# Patient Record
Sex: MALE | Race: OTHER
[De-identification: names, ages, dates, MRNs, and addresses within clinical notes are randomized per-mention and may not be internally consistent; named-entity substitution may affect disease eponyms.]

---

## 2019-12-21 ENCOUNTER — HOSPITAL ENCOUNTER (EMERGENCY)
Dept: HOSPITAL 15 - ER | Age: 39
Discharge: HOME | End: 2019-12-21
Payer: COMMERCIAL

## 2019-12-21 VITALS — BODY MASS INDEX: 29.35 KG/M2 | WEIGHT: 205 LBS | HEIGHT: 70 IN

## 2019-12-21 VITALS — SYSTOLIC BLOOD PRESSURE: 149 MMHG | DIASTOLIC BLOOD PRESSURE: 100 MMHG
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
## 2019-12-21 DIAGNOSIS — S63.641A: Primary | ICD-10-CM
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
## 2019-12-21 PROCEDURE — 73140 X-RAY EXAM OF FINGER(S): CPT

## 2021-04-22 ENCOUNTER — HOSPITAL ENCOUNTER (EMERGENCY)
Dept: HOSPITAL 15 - ER | Age: 41
Discharge: HOME | End: 2021-04-22
Payer: COMMERCIAL

## 2021-04-22 VITALS — DIASTOLIC BLOOD PRESSURE: 107 MMHG | SYSTOLIC BLOOD PRESSURE: 161 MMHG

## 2021-04-22 VITALS — HEIGHT: 70 IN | BODY MASS INDEX: 27.92 KG/M2 | WEIGHT: 195 LBS

## 2021-04-22 DIAGNOSIS — R42: Primary | ICD-10-CM

## 2021-04-22 DIAGNOSIS — F41.9: ICD-10-CM

## 2021-04-22 LAB
ALBUMIN SERPL-MCNC: 4.5 G/DL (ref 3.4–5)
ALCOHOL, URINE: < 3 MG/DL (ref 0–10)
ALP SERPL-CCNC: 73 U/L (ref 45–117)
ALT SERPL-CCNC: 24 U/L (ref 16–61)
AMPHETAMINES UR QL SCN: NEGATIVE
ANION GAP SERPL CALCULATED.3IONS-SCNC: 8 MMOL/L (ref 5–15)
BARBITURATES UR QL SCN: NEGATIVE
BENZODIAZ UR QL SCN: NEGATIVE
BILIRUB SERPL-MCNC: 1.4 MG/DL (ref 0.2–1)
BUN SERPL-MCNC: 19 MG/DL (ref 7–18)
BUN/CREAT SERPL: 18.8
BZE UR QL SCN: NEGATIVE
CALCIUM SERPL-MCNC: 9.3 MG/DL (ref 8.5–10.1)
CANNABINOIDS UR QL SCN: POSITIVE
CHLORIDE SERPL-SCNC: 104 MMOL/L (ref 98–107)
CO2 SERPL-SCNC: 26 MMOL/L (ref 21–32)
GLUCOSE SERPL-MCNC: 90 MG/DL (ref 74–106)
HCT VFR BLD AUTO: 45.1 % (ref 41–53)
HGB BLD-MCNC: 15.3 G/DL (ref 13.5–17.5)
MAGNESIUM SERPL-MCNC: 2.2 MG/DL (ref 1.6–2.6)
MCH RBC QN AUTO: 29.6 PG (ref 28–32)
MCV RBC AUTO: 87.4 FL (ref 80–100)
NRBC BLD QL AUTO: 0.8 %
OPIATES UR QL SCN: NEGATIVE
PCP UR QL SCN: NEGATIVE
POTASSIUM SERPL-SCNC: 4 MMOL/L (ref 3.5–5.1)
PROT SERPL-MCNC: 8.2 G/DL (ref 6.4–8.2)
SODIUM SERPL-SCNC: 138 MMOL/L (ref 136–145)

## 2021-04-22 PROCEDURE — 83735 ASSAY OF MAGNESIUM: CPT

## 2021-04-22 PROCEDURE — 80307 DRUG TEST PRSMV CHEM ANLYZR: CPT

## 2021-04-22 PROCEDURE — 70450 CT HEAD/BRAIN W/O DYE: CPT

## 2021-04-22 PROCEDURE — 93005 ELECTROCARDIOGRAM TRACING: CPT

## 2021-04-22 PROCEDURE — 84484 ASSAY OF TROPONIN QUANT: CPT

## 2021-04-22 PROCEDURE — 80320 DRUG SCREEN QUANTALCOHOLS: CPT

## 2021-04-22 PROCEDURE — 36415 COLL VENOUS BLD VENIPUNCTURE: CPT

## 2021-04-22 PROCEDURE — 85025 COMPLETE CBC W/AUTO DIFF WBC: CPT

## 2021-04-22 PROCEDURE — 80053 COMPREHEN METABOLIC PANEL: CPT

## 2021-04-22 PROCEDURE — 81001 URINALYSIS AUTO W/SCOPE: CPT

## 2025-01-03 ENCOUNTER — HOSPITAL ENCOUNTER (EMERGENCY)
Dept: HOSPITAL 15 - ER | Age: 45
Discharge: HOME | End: 2025-01-03
Payer: COMMERCIAL

## 2025-01-03 VITALS — WEIGHT: 200.4 LBS | BODY MASS INDEX: 28.69 KG/M2 | HEIGHT: 70 IN

## 2025-01-03 VITALS
DIASTOLIC BLOOD PRESSURE: 95 MMHG | HEART RATE: 68 BPM | OXYGEN SATURATION: 96 % | RESPIRATION RATE: 16 BRPM | SYSTOLIC BLOOD PRESSURE: 153 MMHG

## 2025-01-03 DIAGNOSIS — K57.30: Primary | ICD-10-CM
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
## 2025-01-03 DIAGNOSIS — F41.9: ICD-10-CM
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
## 2025-01-03 DIAGNOSIS — Z79.899: ICD-10-CM
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
## 2025-01-03 DIAGNOSIS — S76.012A: ICD-10-CM
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
## 2025-01-03 PROCEDURE — 74176 CT ABD & PELVIS W/O CONTRAST: CPT

## 2025-01-03 NOTE — [facility]
Exam: CT CT AB PEL WO CON-NO ORAL OR IV



History: LEFT GROIN PAIN



Comparison Study:  None



Technique: Multidetector spiral CT of the abdomen and  pelvis  was performed from lung bases to pubic
 symphysis. Imaging was performed without IV contrast. Axial, coronal and sagittal multiplanar reform
ats were obtained from the axial data set by the technologist.



Radiation dose : 



Abdomen/Pelvis: CTDIvol  21 mGy, DLP  913 mGy*cm.



Findings: 



Evaluation of solid organs is limited due to lack of intravenous contrast use.



Lung Bases: No acute or significant lung base finding.  Normal heart size. No pleural or pericardial 
effusion.  



Liver: The liver is normal in size.  No focal lesions. 



Gallbladder and biliary Tree: Unremarkable



Spleen: Unremarkable



Pancreas: The pancreas is grossly normal in appearance. 



Adrenal Glands: Unremarkable 



Kidneys: Kidneys are grossly normal without calculi or hydronephrosis. 



Bladder: Grossly unremarkable for degree of distention.



Bowel: The stomach is grossly normal in appearance. Small bowel and colon are normal in caliber and d
istribution.  Normal appendix is visualized in the right lower quadrant without findings of appendici
tis. Sigmoid diverticulosis.   



Ascites: Absent



Lymphadenopathy: No mesenteric, retroperitoneal or periportal lymphadenopathy. 



Abdominal wall and Mesentery: Unremarkable.



Vasculature: The visualized abdominal aorta is normal in size and caliber.  Evaluation of abdominal a
nd pelvic vessels is limited due to lack of intravenous contrast.



Pelvic Organs: Unremarkable



Musculoskeletal: No aggressive focal bony lesions, acute fractures or dislocation.   



IMPRESSION: 



1. No acute abdominal or pelvic findings. Sigmoid diverticulosis. 



Radiation optimization: All CT scans at this facility use at least one of these dose optimization david
hniques: Automated exposure control mA and/or kV adjustment per patient size (includes targeted exams
 where dose is matched to clinical indication) or iterative reconstruction.



HS:Y



Electronically Signed by: Vishal Garcia at 01/03/2025 14:32:13 PM

## 2025-01-03 NOTE — ED.PDOC
GI ASSESSMENT


HPI Comments


A 44 YEAR OLD MALE PRESENTS TO THE ED WITH CHIEF COMPLAINT OF PELVIC PAIN. 

PATIENT RELAYS THAT HE HAS BEEN EXPERIENCING LEFT GROIN/PELVIC PAIN SINCE 

YESTERDAY. PATIENT DENIES ANY DYSURIA, HEMATURIA, FEVER, CHILLS, ABDOMINAL PAIN,

OR DIZZINESS.


Chief Complaint:  Pelvic Pain


Time Seen by MD:  13:51


Primary Care Provider:  [email or web address]


Reviewed Notes:  Nurses Notes, Medications, Allergies


Allergies:  


Coded Allergies:  


     NO KNOWN ALLERGIES (Unverified , 19)


Home Meds


Active Scripts


Baclofen (Baclofen) 10 Mg Tab, 10 MG PO BID, #20 TAB


   Prov:SHANNON LITTLE         1/3/25


Ibuprofen (Ibuprofen) 800 Mg Tab, 1 TAB PO TID, #30 TAB


   Prov:SHANNON LITTLE         1/3/25


Information Source:  Patient


Mode of Arrival:  Ambulatory


Timing:  Days


Duration:  Since onset


Prehospital treatment:  None


Quality:  Sharp


Vomitus:  None


Stool:  Normal


Severity:  Moderate


Recent:  None


Recent Hx of:  None


Pain Location:  Other (LEFT PELVIC)


Modifying Factors:  Movement, Nothing


Associated sign and symptoms:  Other (LEFT PELVIC PAIN)





Past Medical History


PAST MEDICAL HISTORY:  Anxiety


Surgical History:  Denies all surgeries





Family History


Family History:  Family hx of HTN





Social History


Smoker:  Non-Smoker


Alcohol:  Rarely


Drugs:  Denies Drug Use


Lives In:  Home





Constitutional:  denies: chills, diaphoresis, fatigue, fever, malaise, sweats, 

weakness, others


EENTM:  denies: blurred vision, double vision, ear bleeding, ear discharge, ear 

drainage, ear pain, ear ringing, eye pain, eye redness, hearing loss, mouth 

pain, mouth swelling, nasal discharge, nose bleeding, nose congestion, nose pain

, photophobia, tearing, throat pain, throat swelling, voice changes, others


Respiratory:  denies: cough, hemoptysis, orthopnea, SOB at rest, shortness of 

breath, SOB with excertion, stridor, wheezing, others


Cardiovascular:  denies: chest pain, dizzy spells, diaphoresis, Dyspnea on 

exertion, edema, irregular heart beat, left arm pain, lightheadedness, 

palpitations, PND, syncope, others


Gastrointestinal:  reports: others (LEFT PELVIC PAIN); denies: abdomen 

distended, abdominal pain, blood streaked bowels, constipated, diarrhea, 

dysphagia, difficulty swallowing, hematemesis, melena, nausea, poor appetite, 

poor fluid intake, rectal bleeding, rectal pain, vomiting


Genitourinary:  denies: burning, dysuria, flank pain, frequency, hematuria, 

incontinence, penile discharge, penile sore, pain, testicle pain, testicle 

swelling, urgency, others


Neurological:  denies: dizziness, fainting, headache, left sided numbness, left 

sided weakness, numbness, paresthesia, pre-existing deficit, right sided 

numbness, right sided weakness, seizure, speech problems, tingling, tremors, 

weakness, others


Musculoskeletal:  reports: muscle pain; denies: back pain, gout, joint pain, 

joint swelling, muscle stiffness, neck pain, others


Integumetry:  denies: bruises, change in color, change in hair/nails, dryness, 

laceration, lesions, lumps, rash, wounds, others


Allergic/Immunocompromised:  denies: Difficulty Healing, Frequent Infections, 

Hives, Itching, others


Hematologic/Lymphatic:  denies: anemia, blood clots, easy bleeding, easy 

bruising, swollen glands, others


Endocrine:  denies: excessive hunger, excessive sweating, excessive thirst, 

excessive urination, flushing, intolerance to cold, intolerance to heat, 

unexplained weight gain, unexplained weight loss, others


Psychiatric:  denies: anxiety, bipolar disorder, depression, hopeless, panic 

disorder, schizophrenia, sleepless, suicidal, others


All Other Systems:  Reviewed and Negative





Physical Exam


General Appearance:  No Apparent Distress, Normal


HEENT:  Normal ENT Inspection, PERRL/EOMI, Pharynx Normal


Neck:  Full Range of Motion, Non-Tender, Normal, Normal Inspection


Respiratory:  Chest Non-Tender, Lungs Clear, No Accessory Muscle Use, No 

Respiratory Distress, Normal Breath Sounds


Cardiovascular:  No Edema, No JVD, No Murmur, No Gallop, Normal Peripheral 

Pulses, Regular Rate/Rhythm


Breast Exam:  Deferred


Gastrointestinal:  No Organomegaly, No Pulsatile Mass, Normal Bowel Sounds, 

Soft, Tenderness (LEFT PELVIC, NO GUARDING AND REBOUND TENDERNESS. )


Genitalia:  Deferred


Pelvic:  Normal External Exam, Other (TENDERNESS LEFT PELVIC, NO INGUINAL HERNIA

SEEN. )


Rectal:  Deferred


Extremities:  No calf tenderness, Normal capillary refill, Normal inspection, 

Normal range of motion, Non-tender, No pedal edema


Musculoskeletal :  


   Apperance:  Normal


Neurologic:  Alert, CNs II-XII nml as Tested, No Motor Deficits, Normal Affect, 

Normal Mood, No Sensory Deficits


Cerebellar Function:  Normal


Reflexes:  Normal


Skin:  Dry, Normal Color, Warm


Peripheral Pulses:  2+ carotid (R), 2+ carotid (L)


Lymphatic:  No Adenopathy





Was a procedure done?


Was a procedure done?:  No





GI differential Dx


Differential Diagnosis:  Diverticular disease, Gastritis/PUD, Gastroenteritis, 

UTI, Urolithiasis, Kidney Stone, Other (HERNIA, MUSCLE STRAIN OF LEFT PELVIC )





X-Ray, Labs, Meds, VS





                                   Vital Signs








  Date Time  Temp Pulse Resp B/P (MAP) Pulse Ox O2 Delivery O2 Flow Rate FiO2


 


1/3/25 12:40  68 16 153/95 (114) 96   


 


1/3/25 12:10 97.8 72 16 140/94 (109) 96   





PATIENT: TRISHA STOUTT: [id number]: [id number]


: 1980           LOC: ER                   ROOM / BED:  / 


AGE / SEX: 44 / M         ADM STATUS: REG ER        SERVICE DT: 25 1353





ORDERING PHYSICIAN: SHANNON LITTLE


PROCEDURE(s): ABPL - CT AB PEL WO CON-NO ORAL OR IV


REASON: LEFT GROIN PAIN 


ORDER NUMBER(s): [id number], ACCESSION NUMBER(s): [id number]








Exam: CT CT AB PEL WO CON-NO ORAL OR IV





History: LEFT GROIN PAIN





Comparison Study:  None





Technique: Multidetector spiral CT of the abdomen and  pelvis  was performed 

from lung bases to pubic symphysis. Imaging was performed without IV contrast. 

Axial, coronal and sagittal multiplanar reformats were obtained from the axial 

data set by the technologist.





Radiation dose : 





Abdomen/Pelvis: CTDIvol  21 mGy, DLP  913 mGy*cm.





Findings: 





Evaluation of solid organs is limited due to lack of intravenous contrast use.





Lung Bases: No acute or significant lung base finding.  Normal heart size. No 

pleural or pericardial effusion.  





Liver: The liver is normal in size.  No focal lesions. 





Gallbladder and biliary Tree: Unremarkable





Spleen: Unremarkable





Pancreas: The pancreas is grossly normal in appearance. 





Adrenal Glands: Unremarkable 





Kidneys: Kidneys are grossly normal without calculi or hydronephrosis. 





Bladder: Grossly unremarkable for degree of distention.





Bowel: The stomach is grossly normal in appearance. Small bowel and colon are 

normal in caliber and distribution.  Normal appendix is visualized in the right 

lower quadrant without findings of appendicitis. Sigmoid diverticulosis.   





Ascites: Absent





Lymphadenopathy: No mesenteric, retroperitoneal or periportal lymphadenopathy. 





Abdominal wall and Mesentery: Unremarkable.





Vasculature: The visualized abdominal aorta is normal in size and caliber.  

Evaluation of abdominal and pelvic vessels is limited due to lack of intravenous

contrast.





Pelvic Organs: Unremarkable





Musculoskeletal: No aggressive focal bony lesions, acute fractures or dislocatio

n.   





IMPRESSION: 





1. No acute abdominal or pelvic findings. Sigmoid diverticulosis. 





Radiation optimization: All CT scans at this facility use at least one of these 

dose optimization techniques: Automated exposure control mA and/or kV adjustment

per patient size (includes targeted exams where dose is matched to clinical 

indication) or iterative reconstruction.





HS:Y





Electronically Signed by: Vishal Garcia at 2025 14:32:13 PM











DICTATED BY: VISHAL GARCIA MD


DICTATED DATE/TIME: 25 143





SIGNED BY: VISHAL GARCIA MD


SIGNED DATE/TIME: 25 143





CC: 





X-Ray, Labs, Meds, VS Comment


- I reviewed the following notes from patient's past medical encounters: 21

FOR DIZZINESS





- The following tests were ordered, and results were reviewed by me: CT ABD/PEL





- Additional information was gathered from interviewing the following 

independent Historian: ERIKA Dover I reviewed and agreed with the following test results read by other provider: 

CT ABD/PEL





- I discussed treatments and results with medical personnel.


Time of 1ST Reevaluation:  14:52


Reevaluation 1ST:  Improved


Patient Education/Counseling:  Diagnosis, Treatment, Need For Follow Up


Family Education/Counseling:  Diagnosis, Treatment, No Family Present


Medical Screening:  No EMC Exist At This Time





Departure 1


Departure


Time of Disposition:  14:52


Impression:  


   Primary Impression:  


   Left groin pain


   Additional Impression:  


   Muscle strain of left hip


   Qualified Codes:  S76.012A - Strain of muscle, fascia and tendon of left hip,

   initial encounter


Disposition:   HOME / SELF CARE / HOMELESS


Condition:  Stable





Additional Instructions:  


FOLLOW UP WITH YOUR PCP WITHIN 2-3 DAYS.


e-Prescriptions


Baclofen (Baclofen) 10 Mg Tab


10 MG PO BID, #20 TAB


   Prov: SHANNON LITTLE         1/3/25 


Ibuprofen (Ibuprofen) 800 Mg Tab


1 TAB PO TID, #30 TAB


   Prov: SHANNON LITTLE         1/3/25


Discharged With:  Self





Critical Care Note


Critical Care Time?:  No





Stability


Stability form required:  No





Heart Score


Heart Score:  








Heart Score Response (Comments) Value


 


History N/A 0


 


EKG N/A 0


 


Age N/A 0


 


Risk Factors N/A 0


 


Troponin N/A 0


 


Total  0














I personally scribed for SHANNON LITTLE ([email or web address]) on 1/3/25 at 13:54.  

Electronically submitted by Timoteo Avila ([email or web address]).


I personally scribed for SHANNON LITTLE ([email or web address]) on 1/3/25 at 14:43.  

Electronically submitted by Timoteo Avila ([email or web address]).





SHANNON LITTLE                  Sina 3, 2025 13:54